# Patient Record
Sex: MALE | Race: WHITE | NOT HISPANIC OR LATINO | URBAN - METROPOLITAN AREA
[De-identification: names, ages, dates, MRNs, and addresses within clinical notes are randomized per-mention and may not be internally consistent; named-entity substitution may affect disease eponyms.]

---

## 2018-03-01 ENCOUNTER — APPOINTMENT (RX ONLY)
Dept: URBAN - METROPOLITAN AREA CLINIC 304 | Facility: CLINIC | Age: 58
Setting detail: DERMATOLOGY
End: 2018-03-01

## 2018-03-01 DIAGNOSIS — D17 BENIGN LIPOMATOUS NEOPLASM: ICD-10-CM

## 2018-03-01 DIAGNOSIS — D485 NEOPLASM OF UNCERTAIN BEHAVIOR OF SKIN: ICD-10-CM

## 2018-03-01 PROBLEM — D23.5 OTHER BENIGN NEOPLASM OF SKIN OF TRUNK: Status: ACTIVE | Noted: 2018-03-01

## 2018-03-01 PROBLEM — D23.11 OTHER BENIGN NEOPLASM OF SKIN OF RIGHT EYELID, INCLUDING CANTHUS: Status: ACTIVE | Noted: 2018-03-01

## 2018-03-01 PROBLEM — E05.90 THYROTOXICOSIS, UNSPECIFIED WITHOUT THYROTOXIC CRISIS OR STORM: Status: ACTIVE | Noted: 2018-03-01

## 2018-03-01 PROBLEM — I10 ESSENTIAL (PRIMARY) HYPERTENSION: Status: ACTIVE | Noted: 2018-03-01

## 2018-03-01 PROBLEM — G40.909 EPILEPSY, UNSPECIFIED, NOT INTRACTABLE, WITHOUT STATUS EPILEPTICUS: Status: ACTIVE | Noted: 2018-03-01

## 2018-03-01 PROBLEM — F32.9 MAJOR DEPRESSIVE DISORDER, SINGLE EPISODE, UNSPECIFIED: Status: ACTIVE | Noted: 2018-03-01

## 2018-03-01 PROBLEM — D48.5 NEOPLASM OF UNCERTAIN BEHAVIOR OF SKIN: Status: ACTIVE | Noted: 2018-03-01

## 2018-03-01 PROBLEM — D17.0 BENIGN LIPOMATOUS NEOPLASM OF SKIN AND SUBCUTANEOUS TISSUE OF HEAD, FACE AND NECK: Status: ACTIVE | Noted: 2018-03-01

## 2018-03-01 PROBLEM — E13.9 OTHER SPECIFIED DIABETES MELLITUS WITHOUT COMPLICATIONS: Status: ACTIVE | Noted: 2018-03-01

## 2018-03-01 PROCEDURE — ? COUNSELING

## 2018-03-01 PROCEDURE — 99203 OFFICE O/P NEW LOW 30 MIN: CPT | Mod: 25

## 2018-03-01 PROCEDURE — ? BIOPSY BY SHAVE METHOD

## 2018-03-01 PROCEDURE — 11100: CPT

## 2018-03-01 ASSESSMENT — LOCATION SIMPLE DESCRIPTION DERM
LOCATION SIMPLE: RIGHT FOREHEAD
LOCATION SIMPLE: RIGHT CALF

## 2018-03-01 ASSESSMENT — LOCATION DETAILED DESCRIPTION DERM
LOCATION DETAILED: RIGHT SUPERIOR FOREHEAD
LOCATION DETAILED: RIGHT DISTAL CALF

## 2018-03-01 ASSESSMENT — LOCATION ZONE DERM
LOCATION ZONE: FACE
LOCATION ZONE: LEG

## 2025-01-29 ENCOUNTER — APPOINTMENT (OUTPATIENT)
Dept: URBAN - METROPOLITAN AREA CLINIC 94 | Facility: CLINIC | Age: 65
Setting detail: DERMATOLOGY
End: 2025-01-29

## 2025-01-29 PROBLEM — C44.311 BASAL CELL CARCINOMA OF SKIN OF NOSE: Status: ACTIVE | Noted: 2025-01-29

## 2025-01-29 PROCEDURE — ? TREATMENT REGIMEN

## 2025-01-29 PROCEDURE — 99203 OFFICE O/P NEW LOW 30 MIN: CPT

## 2025-01-29 PROCEDURE — ? COUNSELING

## 2025-01-29 NOTE — PROCEDURE: TREATMENT REGIMEN
Detail Level: Zone
Plan: Patient reports that the lesion was treated with LN2 at his pcp. Pt reports that the lesion was then Biopsied at another dermatologist 1-2 months ago showing “cancer”  Patient unsure the name of the dermatologist he saw for the biopsy. \\n\\nCalled pt’s pcp (Dr. Austin) to find out which dermatologist pt was originally referred to. Biopsy was performed at [facility]. Copy of pathology report was obtained from [facility]. \\n\\nDiscussed at length whether pt would like to have Mohs at our office with Dr. Trent vs [facility]. Patient opted to call [facility] to schedule Mohs so that he can stay in [place]. Pt given their phone number and the name of the procedure. Pt also given our back line extension to call should he have any issues or wish to purse treatment with our office. Recommended scheduling a full body skin exam.

## 2025-01-29 NOTE — PROCEDURE: MIPS QUALITY
Additional Notes: Pt counseled for several minutes regarding smoking cessation
Detail Level: Detailed
Quality 226: Preventive Care And Screening: Tobacco Use: Screening And Cessation Intervention: Patient screened for tobacco use, is a smoker AND received Cessation Counseling within measurement period or in the six months prior to the measurement period

## 2025-04-02 ENCOUNTER — APPOINTMENT (OUTPATIENT)
Dept: URBAN - METROPOLITAN AREA CLINIC 94 | Facility: CLINIC | Age: 65
Setting detail: DERMATOLOGY
End: 2025-04-02